# Patient Record
Sex: FEMALE | Race: WHITE | NOT HISPANIC OR LATINO | Employment: FULL TIME | URBAN - METROPOLITAN AREA
[De-identification: names, ages, dates, MRNs, and addresses within clinical notes are randomized per-mention and may not be internally consistent; named-entity substitution may affect disease eponyms.]

---

## 2021-04-28 NOTE — TELEPHONE ENCOUNTER
REFERRAL INFORMATION:    Referring Provider:  Self Referral     Referring Clinic:  N/A    Reason for Visit/Diagnosis: Abdominal pain after eating, Chronic diarrhea     FUTURE VISIT INFORMATION:    Appointment Date: 4/29/2021    Appointment Time: 10:40 AM      NOTES STATUS DETAILS   OFFICE NOTE from Referring Provider N/A    OFFICE NOTE from Other Specialist Care Everywhere 1/8/2021, 8/3/2020, 9/27/19 Office visit with Dr. Edward Stevens ([facility]) - more office visits in     12/11/19 Office visit with Dr. Eulalio Rossi ([facility])     8/20/19, 6/19/19 Office visit with Dr. Tianna Bond ( [facility])    4/24/19 Office visit with Dr. Yecenia Araujo ([facility] [place])    4/19/19, 4/17/19 Office visit with CATHY Tyler CNP ([facility])     12/8/17 Office visit with CATHY Heaton CNP ([facility] [place] [facility])       HOSPITAL DISCHARGE SUMMARY/  ED VISITS N/A    OPERATIVE REPORT N/A    MEDICATION LIST Care Everywhere         ENDOSCOPY  Care Everywhere EGD: 4/30/19 ([facility])   COLONOSCOPY Care Everywhere 12/20/19 ([facility])  4/30/19 ([facility])   ERCP N/A    EUS N/A    STOOL TESTING Care Everywhere 8/9/2020, 8/3/2020   PERTINENT LABS Care Everywhere    PATHOLOGY REPORTS (RELATED) Care Everywhere 12/20/19, 4/30/19   IMAGING (CT, MRI, EGD, MRCP, Small Bowel Follow Through/SBT, MR/CT Enterography) Care Everywhere [facility]:  - CT Abdomen Pelvis: 5/11/2020    [facility]:  - CT Abdomen Pelvis: 12/16/19    [facility]:  - CT Abdomen Pelvis: 4/17/19

## 2021-04-29 ENCOUNTER — PRE VISIT (OUTPATIENT)
Dept: GASTROENTEROLOGY | Facility: CLINIC | Age: 31
End: 2021-04-29

## 2021-04-29 ENCOUNTER — MYC MEDICAL ADVICE (OUTPATIENT)
Dept: GASTROENTEROLOGY | Facility: CLINIC | Age: 31
End: 2021-04-29

## 2021-04-29 ENCOUNTER — VIRTUAL VISIT (OUTPATIENT)
Dept: GASTROENTEROLOGY | Facility: CLINIC | Age: 31
End: 2021-04-29
Payer: COMMERCIAL

## 2021-04-29 VITALS — HEIGHT: 69 IN | WEIGHT: 190 LBS | BODY MASS INDEX: 28.14 KG/M2

## 2021-04-29 DIAGNOSIS — R03.0 ELEVATED BLOOD PRESSURE READING WITHOUT DIAGNOSIS OF HYPERTENSION: ICD-10-CM

## 2021-04-29 DIAGNOSIS — Z72.0 TOBACCO ABUSE: ICD-10-CM

## 2021-04-29 DIAGNOSIS — R10.84 ABDOMINAL PAIN, GENERALIZED: Primary | ICD-10-CM

## 2021-04-29 DIAGNOSIS — N18.30 STAGE 3 CHRONIC KIDNEY DISEASE, UNSPECIFIED WHETHER STAGE 3A OR 3B CKD (H): ICD-10-CM

## 2021-04-29 PROCEDURE — 99417 PROLNG OP E/M EACH 15 MIN: CPT | Performed by: PHYSICIAN ASSISTANT

## 2021-04-29 PROCEDURE — 99205 OFFICE O/P NEW HI 60 MIN: CPT | Mod: GT | Performed by: PHYSICIAN ASSISTANT

## 2021-04-29 RX ORDER — ACYCLOVIR 200 MG/1
400 CAPSULE ORAL
COMMUNITY
Start: 2019-06-03

## 2021-04-29 ASSESSMENT — MIFFLIN-ST. JEOR: SCORE: 1651.21

## 2021-04-29 NOTE — PROGRESS NOTES
"Winsome Haddad is a 30 year old female who is being evaluated via a billable video visit.      Please send link to email:  [email or web address]    The patient has been notified of following:     \"This video visit will be conducted via a call between you and your physician/provider. We have found that certain health care needs can be provided without the need for an in-person physical exam.  This service lets us provide the care you need with a video conversation.  If a prescription is necessary we can send it directly to your pharmacy.  If lab work is needed we can place an order for that and you can then stop by our lab to have the test done at a later time.    If during the course of the call the physician/provider feels a video visit is not appropriate, you will not be charged for this service.\"     Patient confirmed that they are in [place] for today's visit yes.    Video-Visit Details  Type of service:  Video Visit    Video Start Time: 1040  Video End Time:  11:33AM    Originating Location (pt. Location): Home    Distant Location (provider location):  [facility] [employer]     Platform used: Doximity            "

## 2021-04-29 NOTE — PROGRESS NOTES
GI CLINIC VISIT    CC/REFERRING MD:  Edward Stevens  REASON FOR CONSULTATION: Abdominal pain    ASSESSMENT/PLAN:  30-year-old female with history of depression, hypertension, Raynaud's, stage III chronic kidney disease, abdominal pain with history of constipation who presents to the GI clinic for third opinion consultation regarding abdominal pain and change in bowel pattern.    1. Abdominal pain, remote history of constipation now with frequent loose stools: Case was discussed at GI collaborative conference.  Patient's extensive work-up has yet to reveal any specific GI sources that would be contributing to her pain other than possibly an increase stool burden on imaging that may indicate she might be experiencing overflow diarrhea and the stool burden may be contributing to her abdominal pain.  There was consideration regarding patient's alcohol use to cope with pain and whether this may be aggravating her symptoms and also potentially leading to her elevated blood pressure which is most concerning.  I have suggested the patient to initiate MiraLAX to see if this allows for improved evacuation of her stool and in turn improvement of the abdominal pain.  I do not think that repeat endoscopic assessments are going to be fruitful in revealing additional causes given to unremarkable recent assessments.  I do think it is very important for the patient to have close monitoring and plan for her ongoing hypertensive issues and subsequent kidney disease as listed below.  --Trial of MiraLAX scheduled on a daily basis to allow for improved evacuation to see if this allows for relief of her abdominal discomfort    2.  Hypertension: In a young patient this is certainly concerning and it seems that it has been difficult to control even with multiple medications.  I see patient has been following closely with her primary care provider and has had consultation with both nephrology and even rheumatology in conjunction with  Raynaud's phenomenon.  Based on documentation it appears that she has been worked up for pheochromocytoma but certainly should be strongly considered in a young patient with ongoing hypertension.  I would encourage the patient to reestablish care with nephrology for additional considerations regarding management of her blood pressure.  -- I will plan to touch base with patient's PCP.     3. Colorectal cancer screening: Patient has no personal or family history of colorectal cancer.  Recommend age-appropriate screening.  No evidence of dysplasia on endoscopic assessments.    Thank you for this consultation.  190 minutes spent on the date of the encounter doing chart review, history and exam, documentation, case discussion and further activities as noted above.  It was a pleasure to participate in the care of this patient; please contact us with any further questions.      Holden Oneill PA-C  Division of Gastroenterology, Hepatology and Nutrition  [facility]    ADDENDUM: Spoke with PCP about conference and feedback that might be of assistance. Pheochomocytoma has been explored in the last and HTN was discussed as a high priority issue for the patient. Also discussed concerns for possible ETOH abuse as contributing factor to HTN and additional symptoms. PCP will be working directly with the patient regarding these concerns. From a GI perspective, no additional recommendations warrant additional follow up at this time.     HPI  30-year-old female with history of depression, hypertension, Raynaud's, stage III chronic kidney disease, abdominal pain with history of constipation who presents to the GI clinic for third opinion consultation regarding abdominal pain and change in bowel pattern.    For as long as the patient remembers she has had 1 bowel movement per week.  This never was a problem and did not contribute to pain.  She had an episode of Cdiff and was treated in 2016.  Beginning in 2017 began seeing  providers to help with constipation as abdominal pain began to be present. Used Miralax one cap as needed (never took regularly).  Would have blood in the stool intermittently.      Shortly after this time she began having problems with her blood pressure and began seeing cardiology.  There was thought that her abdominal discomfort that had started needed to get under control in order to also help her blood pressure. Took BP meds for some time then discontinued (despite having high BP).    Starting in 2019 abruptly began to have 8+ liquid stools per day with blood in the stool once per week with associated abdominal pain.  Often stools are clustered in the morning (avg 4) then will have a BM every time she eats.  Notices blood in the stool once per week, sometimes BRBPR, sometimes dark red.  Stool is brown water bristol 7 and occasionally will be bristol 6. No laxatives at this time. In the past (2017) was prescribed miralax. She has taken her BP during a BM and reports a reading of 200/140 with BMs. She is alerted that she will need to use the restroom by excruciating pain, occasionally will have some incontinence. Stabbing pain in the middle of the night will indicate she needs to have a BM.  Sometimes stool will fall out and sometime will just be water that she will need to have significant straining.  Pain described as low pelvis bilaterally and in back.  Used to only be left, but since 2019 is on both sides. Pain is excruciating before BM (9-10/10) then relieved after a BM, however is always about a 5-6 on a scale.  Pain with eating but that is because this leads to a BM. Uses alcohol and will drink until she falls asleep. Heat pad also helps and holding pressure on lower pelvis. Has been gluten free of Nov 2020 which was helpful for 2 months but now not helping as much.    Upper GI symptoms: has acid reflux every time she eats. No nausea or vomiting.    Work up to date:   12/2017 [facility] for LLQ pain,  bloating. Blood work to include BMP, CRP, ESR, TTg IgA and total IgA, lipase, LFTs, CBC within normal limits. Pelvic ultrasound normal other than cysts on both ovaries.    4/2019 Colonoscopy ([facility]) unremarkable  4/2019 EGD ([facility]) small Hiatal hernia, normal stomach, duodenum.  4/2019 CT abd/pelvis unremarkable  Pelvic floor evaluation: decreased rectal sensation, no pelvic floor therapy was pursued.      [facility]:  Colonoscopy 12/20/2019 normal ileum, colon and rectum.  Normal colon random bxs.   CTE normal small bowel and colon.   Abd xray: moderate colonic stool burden  Stool studies negative for infection  Cortisol S levels normal  TSH normal  Considered trigger point injection for chronic abdominal wall pain if above was normal/negative.    Rheumatology work up   Beta 2 Glycoprotein 1 IgG IgM normal/negative  Lupus blood work up negative (protime, IND, APTT, Thrombin time, DRVV screen ratio, PTTLA raio, Lupus anticoag Intrp)  OLGA positive  Anti-SM by EIA, Anti-U1-RNP by EIA, Anti-RO by EIA, Anti-LA by EIA, Anti-Scl-70 by EIA, Anti-Centromere, C3 complement, C4 complement, ANti-TPO AB, ANti-Cardiolipin IgA, Anti-Cardiolipin IgM, Anti-Chromatin, Anti-dsDNA Ab normal  OLGA Pattern 1 speckled   OLGA Titer 1 high  Generally unremarkable.     CT abd/pelvis with contrast 5/2020  Stable left renal angiomyolipoma  PCP spoke with radiologist, and no evidence of celiac artery constriction.     5/8/2020 US arterial renal artery: bilateral renal arteries without evidence of stenosis.    1/2021 Abd U/S: negative other than hyperechogenic left renal cysts solid lesion corresponding with known angiomyolipoma in this area of CT scans.     ROS:    No fevers or chills  No weight loss (has been stable weight)  No blurry vision, double vision or change in vision  + sore throat (chronic)  No lymphadenopathy  No headache, paraesthesias, or weakness in a limb  No shortness of breath or wheezing  No chest pain or pressure  No arthralgias  or myalgias  No rashes or skin changes  No odynophagia or dysphagia  No BRBPR, hematochezia, melena  No dysuria, frequency or urgency  No hot/cold intolerance or polyria  No anxiety or depression    PROBLEM LIST  There are no active problems to display for this patient.      PERTINENT PAST MEDICAL HISTORY:  Depression  Chronic abdominal pain  Hypertension  Herpes  No past medical history on file.    PREVIOUS SURGERIES:  Tonsillectomy   No past surgical history on file.    PREVIOUS ENDOSCOPY:  See above    ALLERGIES:   Not on File    PERTINENT MEDICATIONS:  No current outpatient medications on file.    SOCIAL HISTORY:  Tobacco use is 5-6 cigarettes per day  ETOH use: 12 pack over the weekend then maybe a 6 pack during the week  Marijuana use: none  Illicit drug use: none  Current working as a    Social History     Socioeconomic History     Marital status: Single     Spouse name: Not on file     Number of children: Not on file     Years of education: Not on file     Highest education level: Not on file   Occupational History     Not on file   Social Needs     Financial resource strain: Not on file     Food insecurity     Worry: Not on file     Inability: Not on file     Transportation needs     Medical: Not on file     Non-medical: Not on file   Tobacco Use     Smoking status: Not on file   Substance and Sexual Activity     Alcohol use: Not on file     Drug use: Not on file     Sexual activity: Not on file   Lifestyle     Physical activity     Days per week: Not on file     Minutes per session: Not on file     Stress: Not on file   Relationships     Social connections     Talks on phone: Not on file     Gets together: Not on file     Attends Rastafari service: Not on file     Active member of club or organization: Not on file     Attends meetings of clubs or organizations: Not on file     Relationship status: Not on file     Intimate partner violence     Fear of current or ex partner: Not on file      "Emotionally abused: Not on file     Physically abused: Not on file     Forced sexual activity: Not on file   Other Topics Concern     Not on file   Social History Narrative     Not on file       FAMILY HISTORY:  FH of CRC: None  FH of IBD: \"All of my aunts have Crohn's\"  No family history on file.    Past/family/social history reviewed and no changes    PHYSICAL EXAMINATION:  Constitutional: aaox3, cooperative, pleasant, not dyspneic/diaphoretic, no acute distress  Vitals reviewed: There were no vitals taken for this visit.  Wt:   Wt Readings from Last 2 Encounters:   No data found for Wt      Constitutional - general appearance is well and in no acute distress. Body habitus normal  Eyes - No redness or discharge  Respiratory - No cough, unlabored breathing  Musculoskeletal - range of motion intact: Neck and arms  Skin - No discoloration or lesions  Neurological - No tremors, headaches  Psychiatric - No anxiety, alert & oriented      PERTINENT STUDIES:    No results found for any previous visit.         "

## 2021-04-29 NOTE — NURSING NOTE
"Chief Complaint   Patient presents with     New Patient       Vitals:    04/29/21 1009   Weight: 86.2 kg (190 lb)   Height: 1.753 m (5' 9\")       Body mass index is 28.06 kg/m .    Essie Mckee CMA    "

## 2021-04-29 NOTE — LETTER
4/29/2021         RE: Winsome Haddad  [address]  [facility] [address]        Dear Colleague,    Thank you for referring your patient, Winsome Haddad, to the [facility] [place]. Please see a copy of my visit note below.    GI CLINIC VISIT    CC/REFERRING MD:  Edward Stevens  REASON FOR CONSULTATION: Abdominal pain    ASSESSMENT/PLAN:  30-year-old female with history of depression, hypertension, Raynaud's, stage III chronic kidney disease, abdominal pain with history of constipation who presents to the GI clinic for third opinion consultation regarding abdominal pain and change in bowel pattern.    1. Abdominal pain, remote history of constipation now with frequent loose stools: Case was discussed at GI collaborative conference.  Patient's extensive work-up has yet to reveal any specific GI sources that would be contributing to her pain other than possibly an increase stool burden on imaging that may indicate she might be experiencing overflow diarrhea and the stool burden may be contributing to her abdominal pain.  There was consideration regarding patient's alcohol use to cope with pain and whether this may be aggravating her symptoms and also potentially leading to her elevated blood pressure which is most concerning.  I have suggested the patient to initiate MiraLAX to see if this allows for improved evacuation of her stool and in turn improvement of the abdominal pain.  I do not think that repeat endoscopic assessments are going to be fruitful in revealing additional causes given to unremarkable recent assessments.  I do think it is very important for the patient to have close monitoring and plan for her ongoing hypertensive issues and subsequent kidney disease as listed below.  --Trial of MiraLAX scheduled on a daily basis to allow for improved evacuation to see if this allows for relief of her abdominal discomfort    2.  Hypertension: In a young patient  this is certainly concerning and it seems that it has been difficult to control even with multiple medications.  I see patient has been following closely with her primary care provider and has had consultation with both nephrology and even rheumatology in conjunction with Raynaud's phenomenon.  Based on documentation it appears that she has been worked up for pheochromocytoma but certainly should be strongly considered in a young patient with ongoing hypertension.  I would encourage the patient to reestablish care with nephrology for additional considerations regarding management of her blood pressure.  -- I will plan to touch base with patient's PCP.     3. Colorectal cancer screening: Patient has no personal or family history of colorectal cancer.  Recommend age-appropriate screening.  No evidence of dysplasia on endoscopic assessments.    Thank you for this consultation.  190 minutes spent on the date of the encounter doing chart review, history and exam, documentation, case discussion and further activities as noted above.  It was a pleasure to participate in the care of this patient; please contact us with any further questions.      Holden Oneill PA-C  Division of Gastroenterology, Hepatology and Nutrition  [facility]    ADDENDUM: Spoke with PCP about conference and feedback that might be of assistance. Pheochomocytoma has been explored in the last and HTN was discussed as a high priority issue for the patient. Also discussed concerns for possible ETOH abuse as contributing factor to HTN and additional symptoms. PCP will be working directly with the patient regarding these concerns. From a GI perspective, no additional recommendations warrant additional follow up at this time.     HPI  30-year-old female with history of depression, hypertension, Raynaud's, stage III chronic kidney disease, abdominal pain with history of constipation who presents to the GI clinic for third opinion consultation  regarding abdominal pain and change in bowel pattern.    For as long as the patient remembers she has had 1 bowel movement per week.  This never was a problem and did not contribute to pain.  She had an episode of Cdiff and was treated in 2016.  Beginning in 2017 began seeing providers to help with constipation as abdominal pain began to be present. Used Miralax one cap as needed (never took regularly).  Would have blood in the stool intermittently.      Shortly after this time she began having problems with her blood pressure and began seeing cardiology.  There was thought that her abdominal discomfort that had started needed to get under control in order to also help her blood pressure. Took BP meds for some time then discontinued (despite having high BP).    Starting in 2019 abruptly began to have 8+ liquid stools per day with blood in the stool once per week with associated abdominal pain.  Often stools are clustered in the morning (avg 4) then will have a BM every time she eats.  Notices blood in the stool once per week, sometimes BRBPR, sometimes dark red.  Stool is brown water bristol 7 and occasionally will be bristol 6. No laxatives at this time. In the past (2017) was prescribed miralax. She has taken her BP during a BM and reports a reading of 200/140 with BMs. She is alerted that she will need to use the restroom by excruciating pain, occasionally will have some incontinence. Stabbing pain in the middle of the night will indicate she needs to have a BM.  Sometimes stool will fall out and sometime will just be water that she will need to have significant straining.  Pain described as low pelvis bilaterally and in back.  Used to only be left, but since 2019 is on both sides. Pain is excruciating before BM (9-10/10) then relieved after a BM, however is always about a 5-6 on a scale.  Pain with eating but that is because this leads to a BM. Uses alcohol and will drink until she falls asleep. Heat pad also  helps and holding pressure on lower pelvis. Has been gluten free of Nov 2020 which was helpful for 2 months but now not helping as much.    Upper GI symptoms: has acid reflux every time she eats. No nausea or vomiting.    Work up to date:   12/2017 [facility] for LLQ pain, bloating. Blood work to include BMP, CRP, ESR, TTg IgA and total IgA, lipase, LFTs, CBC within normal limits. Pelvic ultrasound normal other than cysts on both ovaries.    4/2019 Colonoscopy ([facility]) unremarkable  4/2019 EGD ([facility]) small Hiatal hernia, normal stomach, duodenum.  4/2019 CT abd/pelvis unremarkable  Pelvic floor evaluation: decreased rectal sensation, no pelvic floor therapy was pursued.      [facility]:  Colonoscopy 12/20/2019 normal ileum, colon and rectum.  Normal colon random bxs.   CTE normal small bowel and colon.   Abd xray: moderate colonic stool burden  Stool studies negative for infection  Cortisol S levels normal  TSH normal  Considered trigger point injection for chronic abdominal wall pain if above was normal/negative.    Rheumatology work up   Beta 2 Glycoprotein 1 IgG IgM normal/negative  Lupus blood work up negative (protime, IND, APTT, Thrombin time, DRVV screen ratio, PTTLA raio, Lupus anticoag Intrp)  OLGA positive  Anti-SM by EIA, Anti-U1-RNP by EIA, Anti-RO by EIA, Anti-LA by EIA, Anti-Scl-70 by EIA, Anti-Centromere, C3 complement, C4 complement, ANti-TPO AB, ANti-Cardiolipin IgA, Anti-Cardiolipin IgM, Anti-Chromatin, Anti-dsDNA Ab normal  OLGA Pattern 1 speckled   OLGA Titer 1 high  Generally unremarkable.     CT abd/pelvis with contrast 5/2020  Stable left renal angiomyolipoma  PCP spoke with radiologist, and no evidence of celiac artery constriction.     5/8/2020 US arterial renal artery: bilateral renal arteries without evidence of stenosis.    1/2021 Abd U/S: negative other than hyperechogenic left renal cysts solid lesion corresponding with known angiomyolipoma in this area of CT scans.     ROS:    No fevers or  chills  No weight loss (has been stable weight)  No blurry vision, double vision or change in vision  + sore throat (chronic)  No lymphadenopathy  No headache, paraesthesias, or weakness in a limb  No shortness of breath or wheezing  No chest pain or pressure  No arthralgias or myalgias  No rashes or skin changes  No odynophagia or dysphagia  No BRBPR, hematochezia, melena  No dysuria, frequency or urgency  No hot/cold intolerance or polyria  No anxiety or depression    PROBLEM LIST  There are no active problems to display for this patient.      PERTINENT PAST MEDICAL HISTORY:  Depression  Chronic abdominal pain  Hypertension  Herpes  No past medical history on file.    PREVIOUS SURGERIES:  Tonsillectomy   No past surgical history on file.    PREVIOUS ENDOSCOPY:  See above    ALLERGIES:   Not on File    PERTINENT MEDICATIONS:  No current outpatient medications on file.    SOCIAL HISTORY:  Tobacco use is 5-6 cigarettes per day  ETOH use: 12 pack over the weekend then maybe a 6 pack during the week  Marijuana use: none  Illicit drug use: none  Current working as a    Social History     Socioeconomic History     Marital status: Single     Spouse name: Not on file     Number of children: Not on file     Years of education: Not on file     Highest education level: Not on file   Occupational History     Not on file   Social Needs     Financial resource strain: Not on file     Food insecurity     Worry: Not on file     Inability: Not on file     Transportation needs     Medical: Not on file     Non-medical: Not on file   Tobacco Use     Smoking status: Not on file   Substance and Sexual Activity     Alcohol use: Not on file     Drug use: Not on file     Sexual activity: Not on file   Lifestyle     Physical activity     Days per week: Not on file     Minutes per session: Not on file     Stress: Not on file   Relationships     Social connections     Talks on phone: Not on file     Gets together: Not on file      "Attends Anabaptism service: Not on file     Active member of club or organization: Not on file     Attends meetings of clubs or organizations: Not on file     Relationship status: Not on file     Intimate partner violence     Fear of current or ex partner: Not on file     Emotionally abused: Not on file     Physically abused: Not on file     Forced sexual activity: Not on file   Other Topics Concern     Not on file   Social History Narrative     Not on file       FAMILY HISTORY:  FH of CRC: None  FH of IBD: \"All of my aunts have Crohn's\"  No family history on file.    Past/family/social history reviewed and no changes    PHYSICAL EXAMINATION:  Constitutional: aaox3, cooperative, pleasant, not dyspneic/diaphoretic, no acute distress  Vitals reviewed: There were no vitals taken for this visit.  Wt:   Wt Readings from Last 2 Encounters:   No data found for Wt      Constitutional - general appearance is well and in no acute distress. Body habitus normal  Eyes - No redness or discharge  Respiratory - No cough, unlabored breathing  Musculoskeletal - range of motion intact: Neck and arms  Skin - No discoloration or lesions  Neurological - No tremors, headaches  Psychiatric - No anxiety, alert & oriented      PERTINENT STUDIES:    No results found for any previous visit.       Winsome Haddad is a 30 year old female who is being evaluated via a billable video visit.      Please send link to email:  [email or web address]    The patient has been notified of following:     \"This video visit will be conducted via a call between you and your physician/provider. We have found that certain health care needs can be provided without the need for an in-person physical exam.  This service lets us provide the care you need with a video conversation.  If a prescription is necessary we can send it directly to your pharmacy.  If lab work is needed we can place an order for that and you can then stop by our lab to have the test done " "at a later time.    If during the course of the call the physician/provider feels a video visit is not appropriate, you will not be charged for this service.\"     Patient confirmed that they are in [place] for today's visit yes.    Video-Visit Details  Type of service:  Video Visit    Video Start Time: 1040  Video End Time:  11:33AM    Originating Location (pt. Location): [place]    Distant Location (provider location):  [facility] [place]     Platform used: Views[employer]      Again, thank you for allowing me to participate in the care of your patient.      Sincerely,    Holden Oneill PA-C    "

## 2021-05-09 ENCOUNTER — HEALTH MAINTENANCE LETTER (OUTPATIENT)
Age: 31
End: 2021-05-09

## 2021-10-24 ENCOUNTER — HEALTH MAINTENANCE LETTER (OUTPATIENT)
Age: 31
End: 2021-10-24

## 2022-06-05 ENCOUNTER — HEALTH MAINTENANCE LETTER (OUTPATIENT)
Age: 32
End: 2022-06-05

## 2022-10-15 ENCOUNTER — HEALTH MAINTENANCE LETTER (OUTPATIENT)
Age: 32
End: 2022-10-15

## 2023-06-11 ENCOUNTER — HEALTH MAINTENANCE LETTER (OUTPATIENT)
Age: 33
End: 2023-06-11